# Patient Record
Sex: FEMALE | Race: ASIAN | NOT HISPANIC OR LATINO | URBAN - METROPOLITAN AREA
[De-identification: names, ages, dates, MRNs, and addresses within clinical notes are randomized per-mention and may not be internally consistent; named-entity substitution may affect disease eponyms.]

---

## 2017-01-25 ENCOUNTER — ALLSCRIPTS OFFICE VISIT (OUTPATIENT)
Dept: OTHER | Facility: OTHER | Age: 62
End: 2017-01-25

## 2017-01-25 DIAGNOSIS — Z12.31 ENCOUNTER FOR SCREENING MAMMOGRAM FOR MALIGNANT NEOPLASM OF BREAST: ICD-10-CM

## 2017-02-22 ENCOUNTER — GENERIC CONVERSION - ENCOUNTER (OUTPATIENT)
Dept: OTHER | Facility: OTHER | Age: 62
End: 2017-02-22

## 2017-08-08 ENCOUNTER — ALLSCRIPTS OFFICE VISIT (OUTPATIENT)
Dept: OTHER | Facility: OTHER | Age: 62
End: 2017-08-08

## 2017-10-17 ENCOUNTER — GENERIC CONVERSION - ENCOUNTER (OUTPATIENT)
Dept: OTHER | Facility: OTHER | Age: 62
End: 2017-10-17

## 2017-10-25 ENCOUNTER — ALLSCRIPTS OFFICE VISIT (OUTPATIENT)
Dept: OTHER | Facility: OTHER | Age: 62
End: 2017-10-25

## 2017-10-27 NOTE — PROGRESS NOTES
Assessment  1  Preoperative examination (V72 84) (Z01 818)   2  Cataract of both eyes (366 9) (H26 9)   3  Type II diabetes mellitus (250 00) (E11 9)   4  Combined hyperlipidemia (272 2) (E78 2)    Plan  Encounter for screening mammogram for malignant neoplasm of breast    · * MAMMO SCREENING BILATERAL W CAD; Status:Active; Requested LWO:[id number];     Discussion/Summary  Surgical Clearance: She is at a LOW risk from a cardiovascular standpoint at this time without any additional cardiac testing  Reevaluation needed, if she should present with symptoms prior to surgery/procedure  Comments:  13-year-old female here today for preoperative clearance for cataract procedure, utilizing [employer] interpreting service  She is feeling well and is asymptomatic  Exam unremarkable with exception to elevated BP at 152/82 but has been normal at prior appts  She admits to compliance on her medications, which she had not been a few months prior due to reportedly loosing her insurance, and therefore there are no recent blood panels to confirm control of her diabetes  Based on assessment today and low risk procedure, pt cleared for procedure as scheduled and considered low risk for complication  She will be following up with me in december for her management of chronic conditions and review BW at that time  Surgical clearance faxed to Dr Genie Hamlin   Possible side effects of new medications were reviewed with the patient/guardian today  The treatment plan was reviewed with the patient/guardian  The patient/guardian understands and agrees with the treatment plan      Chief Complaint  pt here for pre-op clearance for cataract surgery on the 2nd of november for her right eye      History of Present Illness  Pre-Op Visit (Brief): The patient is being seen for a preoperative visit-- and-- ****VISIT PERFORMED USING OUR INTERPRETING SERVICE FOR [employer]  The procedure is a(n) right cataract scheduled for 11/2/2017   The indication for surgery is cataracts  Surgical Risk Assessment:   Prior Anesthesia: She had no prior anesthesia  Pertinent Past Medical History: type II diabetes - UNCONTROLLED  Exercise Capacity: able to walk four blocks without symptoms-- and-- able to walk two flights of stairs without symptoms  Lifestyle Factors: denies alcohol use, denies tobacco use and denies illegal drug use  Symptoms: no easy bleeding,-- no easy bruising,-- no frequent nosebleeds,-- no chest pain,-- no cough,-- no dyspnea,-- no edema,-- no palpitations-- and-- no wheezing  Pertinent Family History: mother - diabetes  denies FHx of cardiovascular condition or sudden death  Living Situation: home is secure and supportive and no post-op concerns with her living situation  Review of Systems    Constitutional: No fever, no chills, feels well, no tiredness, no recent weight gain or weight loss  Eyes: cataracts  ENT: no complaints of earache, no loss of hearing, no nose bleeds, no nasal discharge, no sore throat, no hoarseness  Cardiovascular: No complaints of slow heart rate, no fast heart rate, no chest pain, no palpitations, no leg claudication, no lower extremity edema  Respiratory: No complaints of shortness of breath, no wheezing, no cough, no SOB on exertion, no orthopnea, no PND  Gastrointestinal: No complaints of abdominal pain, no constipation, no nausea or vomiting, no diarrhea, no bloody stools  Genitourinary: No complaints of dysuria, no incontinence, no pelvic pain, no dysmenorrhea, no vaginal discharge or bleeding  Musculoskeletal: No complaints of arthralgias, no myalgias, no joint swelling or stiffness, no limb pain or swelling  Integumentary: No complaints of skin rash or lesions, no itching, no skin wounds, no breast pain or lump  Neurological: No complaints of headache, no confusion, no convulsions, no numbness, no dizziness or fainting, no tingling, no limb weakness, no difficulty walking     Psychiatric: Not suicidal, no sleep disturbance, no anxiety or depression, no change in personality, no emotional problems  Endocrine: No complaints of proptosis, no hot flashes, no muscle weakness, no deepening of the voice, no feelings of weakness  Hematologic/Lymphatic: No complaints of swollen glands, no swollen glands in the neck, does not bleed easily, does not bruise easily  Active Problems  1  Combined hyperlipidemia (272 2) (E78 2)   2  Encounter for screening mammogram for malignant neoplasm of breast (V76 12)   (Z12 31)   3  Type II diabetes mellitus (250 00) (E11 9)    Past Medical History   · History of Depression screening (V79 0) (Z13 89)   · History of Encounter for screening colonoscopy (V76 51) (Z12 11)   · History of Encounter to establish care (V65 8) (Z76 89)    The active problems and past medical history were reviewed and updated today  Surgical History   · History of Oral Surgery Tooth Extraction    The surgical history was reviewed and updated today  Family History  Mother    · Family history of diabetes mellitus (V18 0) (Z83 3)    The family history was reviewed and updated today  Social History   · Always uses seat belt   ·    · Never a smoker   · Preferred language is Mandarin [facility]   · Reads [facility]  The social history was reviewed and updated today  Current Meds   1  GlipiZIDE XL 10 MG Oral Tablet Extended Release 24 Hour; take 1 tablet PO in AM and   1 1/2 tablets in the evening; Therapy: 59ENV5068 to (Last Rx:71Xzv6909)  Requested for: 61Mfw5759 Ordered   2  Januvia 50 MG Oral Tablet; TAKE 1 TABLET ONCE DAILY; Therapy: 78GXA4155 to (Evaluate:02Ohs8326)  Requested for: 87Bsb7918; Last   Rx:77Ztf4158 Ordered   3  Lisinopril 5 MG Oral Tablet; Take 1 tablet daily; Therapy: 60DXL1762 to (Last Rx:78Hnd9215)  Requested for: 67Ybj2379 Ordered   4   MetFORMIN HCl - 1000 MG Oral Tablet; take 1 tablet PO in the AM and 1 1/2 tablet in PM;   Therapy: 25Jan2017 to (Last SQ:93CSG7248)  Requested for: 85Lny7631 Ordered   5  Simvastatin 10 MG Oral Tablet; TAKE 1 TABLET DAILY; Therapy: 38VCQ4140 to (Evaluate:30Auf1370)  Requested for: 83Zsf7435; Last   Rx:93Emj7168 Ordered    The medication list was reviewed and updated today  Allergies  1  No Known Drug Allergies    Vitals   Recorded: 99DAD5591 03:03PM Recorded: 62Jio9354 02:49PM   Temperature  97 5 F, Tympanic   Heart Rate  94   Pulse Quality  Normal   Respiration Quality Normal Normal   Respiration 15 5   Systolic  258, RUE, Sitting   Diastolic  82, RUE, Sitting   Height  5 ft 0 2 in   Weight  126 lb 7 oz   BMI Calculated  24 53   BSA Calculated  1 54   O2 Saturation  95   Pain Scale  0     Physical Exam    Constitutional   General appearance: No acute distress, well appearing and well nourished  appears healthy,-- comfortable,-- within normal limits of ideal weight-- and-- appearance reflects stated age  -- vitals reviewed  Head and Face   Head and face: Normal     Eyes   Conjunctiva and lids: No swelling, erythema or discharge  Pupils and irises: Equal, round, reactive to light  -- EOMI, PERRLA  wearing glasses  Ears, Nose, Mouth, and Throat   External inspection of ears and nose: Normal     Otoscopic examination: Tympanic membranes translucent with normal light reflex  Canals patent without erythema  Hearing: Normal  -- grossly normal B/L at 10ft  Nasal mucosa, septum, and turbinates: Normal without edema or erythema  Lips, teeth, and gums: Normal, good dentition  Oropharynx: Normal with no erythema, edema, exudate or lesions  Neck   Thyroid: Normal, no thyromegaly  Pulmonary   Respiratory effort: No increased work of breathing or signs of respiratory distress  Auscultation of lungs: Clear to auscultation  Cardiovascular   Auscultation of heart: Normal rate and rhythm, normal S1 and S2, no murmurs  Carotid pulses: 2+ bilaterally    right 2+,-- no bruit heard over the right carotid,-- left 2+-- and-- no bruit heard over the left carotid  Pedal pulses: 2+ bilaterally  Examination of extremities for edema and/or varicosities: Normal     Abdomen   Abdomen: Non-tender, no masses  The abdomen was flat  Bowel sounds were normal  The abdomen was soft and nontender  Lymphatic   Palpation of lymph nodes in neck: No lymphadenopathy  Musculoskeletal   Gait and station: Normal     Digits and nails: Normal without clubbing or cyanosis  Joints, bones, and muscles: Normal     Psychiatric   Judgment and insight: Normal     Orientation to person, place, and time: Normal     Mood and affect: Normal        Results/Data  PHQ-9 Adult Depression Screening 25Oct2017 03:05PM User, [employer]     Test Name Result Flag Reference   PHQ-9 Adult Depression Score 2     Over the last two weeks, how often have you been bothered by any of the following problems? Little interest or pleasure in doing things: Not at all - 0  Feeling down, depressed, or hopeless: Not at all - 0  Trouble falling or staying asleep, or sleeping too much: Several days - 1  Feeling tired or having little energy: Several days - 1  Poor appetite or over eating: Not at all - 0  Feeling bad about yourself - or that you are a failure or have let yourself or your family down: Not at all - 0  Trouble concentrating on things, such as reading the newspaper or watching television: Not at all - 0  Moving or speaking so slowly that other people could have noticed  Or the opposite -  being so fidgety or restless that you have been moving around a lot more than usual: Not at all - 0  Thoughts that you would be better off dead, or of hurting yourself in some way: Not at all - 0   PHQ-9 Adult Depression Screening Negative     PHQ-9 Difficulty Level Not difficult at all     PHQ-9 Severity Minimal Depression         End of Encounter Meds  1  Simvastatin 10 MG Oral Tablet (Zocor); TAKE 1 TABLET DAILY;    Therapy: 22HFB8597 to (Evaluate:60Amo2402)  Requested for: 07RCT8239; Last   Rx:08Cfv9579 Ordered  2  GlipiZIDE XL 10 MG Oral Tablet Extended Release 24 Hour; take 1 tablet PO in AM and   1 1/2 tablets in the evening; Therapy: 00HWJ7324 to (Last Rx:09Znh0654)  Requested for: 08Aug2017 Ordered   3  Januvia 50 MG Oral Tablet; TAKE 1 TABLET ONCE DAILY; Therapy: 57LPK0940 to (Evaluate:68Lhq4765)  Requested for: 23Fyb6643; Last   Rx:28Fuu9432 Ordered   4  Lisinopril 5 MG Oral Tablet; Take 1 tablet daily; Therapy: 65PFU8747 to (Last Rx:50Ctv1105)  Requested for: 08Aug2017 Ordered   5   MetFORMIN HCl - 1000 MG Oral Tablet; take 1 tablet PO in the AM and 1 1/2 tablet in PM;   Therapy: 73ENX0136 to (Last Rx:44Hap7789)  Requested for: 47Pyf3007 Ordered    Future Appointments    Date/Time Provider Specialty Site   12/12/2017 08:50 AM Jack Muñoz, Nurse Schedule  [address]   12/19/2017 10:30 AM Madhavi Cyr, [address]   Electronically signed by : Doyle Silva, [facility]; Oct 26 2017 10:34AM EST                       (Author)    Electronically signed by : Ammon Reddy DO; Oct 26 2017  4:59PM EST                       (Co-author)

## 2017-12-12 ENCOUNTER — LAB REQUISITION (OUTPATIENT)
Dept: LAB | Facility: HOSPITAL | Age: 62
End: 2017-12-12
Payer: COMMERCIAL

## 2017-12-12 DIAGNOSIS — E78.2 MIXED HYPERLIPIDEMIA: ICD-10-CM

## 2017-12-12 DIAGNOSIS — E11.9 TYPE 2 DIABETES MELLITUS WITHOUT COMPLICATIONS (HCC): ICD-10-CM

## 2017-12-12 LAB
ALBUMIN SERPL BCP-MCNC: 4 G/DL (ref 3.5–5)
ALP SERPL-CCNC: 58 U/L (ref 46–116)
ALT SERPL W P-5'-P-CCNC: 16 U/L (ref 12–78)
ANION GAP SERPL CALCULATED.3IONS-SCNC: 6 MMOL/L (ref 4–13)
AST SERPL W P-5'-P-CCNC: 15 U/L (ref 5–45)
BASOPHILS # BLD AUTO: 0.02 THOUSANDS/ΜL (ref 0–0.1)
BASOPHILS NFR BLD AUTO: 0 % (ref 0–1)
BILIRUB SERPL-MCNC: 0.38 MG/DL (ref 0.2–1)
BUN SERPL-MCNC: 11 MG/DL (ref 5–25)
CALCIUM SERPL-MCNC: 8.9 MG/DL (ref 8.3–10.1)
CHLORIDE SERPL-SCNC: 106 MMOL/L (ref 100–108)
CHOLEST SERPL-MCNC: 125 MG/DL (ref 50–200)
CO2 SERPL-SCNC: 28 MMOL/L (ref 21–32)
CREAT SERPL-MCNC: 0.68 MG/DL (ref 0.6–1.3)
CREAT UR-MCNC: 65.2 MG/DL
EOSINOPHIL # BLD AUTO: 0.1 THOUSAND/ΜL (ref 0–0.61)
EOSINOPHIL NFR BLD AUTO: 2 % (ref 0–6)
ERYTHROCYTE [DISTWIDTH] IN BLOOD BY AUTOMATED COUNT: 12.3 % (ref 11.6–15.1)
EST. AVERAGE GLUCOSE BLD GHB EST-MCNC: 151 MG/DL
GFR SERPL CREATININE-BSD FRML MDRD: 93 ML/MIN/1.73SQ M
GLUCOSE P FAST SERPL-MCNC: 87 MG/DL (ref 65–99)
HBA1C MFR BLD: 6.9 % (ref 4.2–6.3)
HCT VFR BLD AUTO: 35.9 % (ref 34.8–46.1)
HDLC SERPL-MCNC: 57 MG/DL (ref 40–60)
HGB BLD-MCNC: 11.6 G/DL (ref 11.5–15.4)
LDLC SERPL CALC-MCNC: 40 MG/DL (ref 0–100)
LYMPHOCYTES # BLD AUTO: 1.78 THOUSANDS/ΜL (ref 0.6–4.47)
LYMPHOCYTES NFR BLD AUTO: 36 % (ref 14–44)
MCH RBC QN AUTO: 32.5 PG (ref 26.8–34.3)
MCHC RBC AUTO-ENTMCNC: 32.3 G/DL (ref 31.4–37.4)
MCV RBC AUTO: 101 FL (ref 82–98)
MICROALBUMIN UR-MCNC: 6.6 MG/L (ref 0–20)
MICROALBUMIN/CREAT 24H UR: 10 MG/G CREATININE (ref 0–30)
MONOCYTES # BLD AUTO: 0.43 THOUSAND/ΜL (ref 0.17–1.22)
MONOCYTES NFR BLD AUTO: 9 % (ref 4–12)
NEUTROPHILS # BLD AUTO: 2.59 THOUSANDS/ΜL (ref 1.85–7.62)
NEUTS SEG NFR BLD AUTO: 53 % (ref 43–75)
NRBC BLD AUTO-RTO: 0 /100 WBCS
PLATELET # BLD AUTO: 259 THOUSANDS/UL (ref 149–390)
PMV BLD AUTO: 10.3 FL (ref 8.9–12.7)
POTASSIUM SERPL-SCNC: 5 MMOL/L (ref 3.5–5.3)
PROT SERPL-MCNC: 7.4 G/DL (ref 6.4–8.2)
RBC # BLD AUTO: 3.57 MILLION/UL (ref 3.81–5.12)
SODIUM SERPL-SCNC: 140 MMOL/L (ref 136–145)
TRIGL SERPL-MCNC: 142 MG/DL
WBC # BLD AUTO: 4.94 THOUSAND/UL (ref 4.31–10.16)

## 2017-12-12 PROCEDURE — 80053 COMPREHEN METABOLIC PANEL: CPT | Performed by: PHYSICIAN ASSISTANT

## 2017-12-12 PROCEDURE — 82043 UR ALBUMIN QUANTITATIVE: CPT | Performed by: PHYSICIAN ASSISTANT

## 2017-12-12 PROCEDURE — 85025 COMPLETE CBC W/AUTO DIFF WBC: CPT | Performed by: PHYSICIAN ASSISTANT

## 2017-12-12 PROCEDURE — 82570 ASSAY OF URINE CREATININE: CPT | Performed by: PHYSICIAN ASSISTANT

## 2017-12-12 PROCEDURE — 80061 LIPID PANEL: CPT | Performed by: PHYSICIAN ASSISTANT

## 2017-12-12 PROCEDURE — 83036 HEMOGLOBIN GLYCOSYLATED A1C: CPT | Performed by: PHYSICIAN ASSISTANT

## 2017-12-13 ENCOUNTER — GENERIC CONVERSION - ENCOUNTER (OUTPATIENT)
Dept: OTHER | Facility: OTHER | Age: 62
End: 2017-12-13

## 2017-12-19 ENCOUNTER — ALLSCRIPTS OFFICE VISIT (OUTPATIENT)
Dept: OTHER | Facility: OTHER | Age: 62
End: 2017-12-19

## 2017-12-19 DIAGNOSIS — Z12.31 ENCOUNTER FOR SCREENING MAMMOGRAM FOR MALIGNANT NEOPLASM OF BREAST: ICD-10-CM

## 2017-12-29 NOTE — PROGRESS NOTES
Assessment   1  Type II diabetes mellitus (250 00) (E11 9)   2  Combined hyperlipidemia (272 2) (E78 2)   3  Influenza vaccine administered (V04 81) (Z23)    Plan   Combined hyperlipidemia, Type II diabetes mellitus    · Continue with our present treatment plan ; Status:Complete;   Done: 76CMM0578  Influenza vaccine administered    · Fluzone Quadrivalent Intramuscular Suspension    Discussion/Summary      77-year-old female here today for routine follow-up as follows:      Type 2 diabetes: Blood work reviewed with most recent hemoglobin A1c at 6 9%  She is compliant on her diabetic medications, tolerating them well  She is on an ACE-inhibitor with normal microalbumin  Kidney function also within normal limits on CMP  Her eye exam is up-to-date October 2017, foot exam up-to-date August 2017  Continue current treatment plan and we will recheck labs in 4-6 months  Total cholesterol 125, LDL 40, triglycerides 142, HDL 57  She is compliant on her atorvastatin  Continue current treatment plan and we will continue to monitor  prescription given  Colonoscopy up-to-date  Fluzone vaccine administered today  Patient requesting a handicap placard due to arthritis in knees and pain and stiffness with long walking  Placard form provided today  Follow-up in 4 months, sooner if any problems  Possible side effects of new medications were reviewed with the patient/guardian today  The treatment plan was reviewed with the patient/guardian  The patient/guardian understands and agrees with the treatment plan      Chief Complaint   pt here for 4 month f/u and to review blood work  pt also states that she would like a application for a placard, pt states that she is not able to walk far because of her pain in both knee  Patient is here today for follow up of chronic conditions described in HPI  History of Present Illness   knees are bothering her, knees feel stiff at times, cannot walk to far without knees hurting   would like handcap sticker  cold causes worse pain  The patient is here today for a follow-up visit  Her diabetes mellitus type 2 is stable  the patient is adherent with her medication regimen  -- She denies medication side effects  Her hyperlipidemia has been stable  Her LDL goal is 70 mg/dL-- and-- last LDL was 40 mg/dL  the patient is adherent with her medication regimen  -- She denies medication side effects  Symptoms: denies chest pain,-- denies intermittent leg claudication,-- denies dyspnea,-- denies lower extremity edema,-- denies exercise intolerance,-- denies fatigue,-- denies numbness of the feet,-- denies foot pain,-- denies a foot ulcer,-- denies visual impairment,-- denies muscle pain-- and-- denies muscle weakness  Lifestyle and Disease Management: Diet: She consumes a diverse and healthy diet  Weight Issues: She does not have any weight concerns  Exercise: She does not exercise regularly  Smoking: She does not use tobacco  Alcohol: She denies alcohol use  Drug Use: She denies drug use  Goals: the patient is doing well with her goals  Review of Systems        Constitutional: No fever, no chills, feels well, no tiredness, no recent weight gain or weight loss  Cardiovascular: No complaints of slow heart rate, no fast heart rate, no chest pain, no palpitations, no leg claudication, no lower extremity edema  Respiratory: No complaints of shortness of breath, no wheezing, no cough, no SOB on exertion, no orthopnea, no PND  Gastrointestinal: No complaints of abdominal pain, no constipation, no nausea or vomiting, no diarrhea, no bloody stools  Genitourinary: No complaints of dysuria, no incontinence, no pelvic pain, no dysmenorrhea, no vaginal discharge or bleeding  Musculoskeletal: as noted in HPI  Neurological: No complaints of headache, no confusion, no convulsions, no numbness, no dizziness or fainting, no tingling, no limb weakness, no difficulty walking  Psychiatric: Not suicidal, no sleep disturbance, no anxiety or depression, no change in personality, no emotional problems  Active Problems   1  Cataract of both eyes (366 9) (H26 9)   2  Combined hyperlipidemia (272 2) (E78 2)   3  Encounter for screening mammogram for malignant neoplasm of breast (V76 12)     (Z12 31)   4  Preoperative examination (V72 84) (Z01 818)   5  Type II diabetes mellitus (250 00) (E11 9)    Past Medical History   1  History of Depression screening (V79 0) (Z13 89)   2  History of Encounter for screening colonoscopy (V76 51) (Z12 11)   3  History of Encounter to establish care (V65 8) (Z76 89)    Surgical History   1  History of Oral Surgery Tooth Extraction     The surgical history was reviewed and updated today  Family History   Mother    1  Family history of diabetes mellitus (V18 0) (Z83 3)     The family history was reviewed and updated today  Social History    · Always uses seat belt   ·    · Never a smoker   · Preferred language is Mandarin [facility]   · Reads [facility]  The social history was reviewed and updated today  Current Meds    1  GlipiZIDE XL 10 MG Oral Tablet Extended Release 24 Hour; take 1 tablet PO in AM and 1     1/2 tablets in the evening; Therapy: 60SSO2492 to (Last Rx:75Scw9082)  Requested for: 23Pec0965 Ordered   2  Januvia 50 MG Oral Tablet; TAKE 1 TABLET ONCE DAILY; Therapy: 31GCY2534 to ((36) 075-621)  Requested for: 27Oct2017; Last     IV:89PBQ4919 Ordered   3  Lisinopril 5 MG Oral Tablet; Take 1 tablet daily; Therapy: 83NDA6651 to (Last Rx:04Cfc1746)  Requested for: 70Cax4411 Ordered   4  MetFORMIN HCl - 1000 MG Oral Tablet; take 1 tablet PO in the AM and 1 1/2 tablet in PM;     Therapy: 06FQR3360 to (Last Rx:06Nov2017)  Requested for: 56OWO5957 Ordered   5  Simvastatin 10 MG Oral Tablet; TAKE 1 TABLET DAILY;      Therapy: 26TME1770 to (Evaluate:40Fzd4117)  Requested for: 06Mfa4406; Last     Rx:85Bhp6849 Ordered The medication list was reviewed and updated today  Allergies   1  No Known Drug Allergies    Vitals   Vital Signs    Recorded: 06IPQ6927 10:36AM   Temperature 98 4 F, Tympanic   Heart Rate 94   Pulse Quality Normal   Respiration Quality Normal   Respiration 16   Systolic 098, LLE, Sitting   Diastolic 90, LLE, Sitting   Height 5 ft    Weight 132 lb 2 oz   BMI Calculated 25 8   BSA Calculated 1 57   O2 Saturation 96   Pain Scale 0     Physical Exam        Constitutional      General appearance: No acute distress, well appearing and well nourished  appears healthy,-- within normal limits of ideal weight-- and-- appearance reflects stated age  Pulmonary      Respiratory effort: No increased work of breathing or signs of respiratory distress  Auscultation of lungs: Clear to auscultation  Cardiovascular      Auscultation of heart: Normal rate and rhythm, normal S1 and S2, without murmurs  Examination of extremities for edema and/or varicosities: Normal  -- pedal pulses intact  Carotid pulses: Normal   right 2+,-- no bruit heard over the right carotid,-- left 2+-- and-- no bruit heard over the left carotid  Lymphatic      Palpation of lymph nodes in neck: No lymphadenopathy  Musculoskeletal      Gait and station: Normal        Psychiatric      Orientation to person, place, and time: Normal        Mood and affect: Normal        Additional Exam:  vitals reviewed - BP stable  diastoic mildly elevated at 90           Results/Data   [facility] - Eye Exam 29PGI5062 12:00AM       Test Name Result Flag Reference   Retinopathy Screening      Retinopathy Screening not performed      Summary / No summary entered :        No summary entered  Documents attached :        [facility]; Enc: [id number] - Image Encounter - [facility]) (Additional Information Document)  Summary / No summary entered :        No summary entered  Documents attached : sOpthalmology - Alyssa Collins; Enc: [id number] - Image Encounter - Ghulam Para - [facility]) (Additional Information Document)  (1) CBC/PLT/DIFF 37EQE6560 09:14AM Jerlean Dandy Order Number: [id number]      Test Name Result Flag Reference   WBC COUNT 4 94 Thousand/uL  4 31-10 16   RBC COUNT 3 57 Million/uL L 3 81-5 12   HEMOGLOBIN 11 6 g/dL  11 5-15 4   HEMATOCRIT 35 9 %  34 8-46  1    fL H 82-98   MCH 32 5 pg  26 8-34 3   MCHC 32 3 g/dL  31 4-37 4   RDW 12 3 %  11 6-15 1   MPV 10 3 fL  8 9-12 7   PLATELET COUNT 517 Thousands/uL  149-390   nRBC AUTOMATED 0 /100 WBCs     NEUTROPHILS RELATIVE PERCENT 53 %  43-75   LYMPHOCYTES RELATIVE PERCENT 36 %  14-44   MONOCYTES RELATIVE PERCENT 9 %  4-12   EOSINOPHILS RELATIVE PERCENT 2 %  0-6   BASOPHILS RELATIVE PERCENT 0 %  0-1   NEUTROPHILS ABSOLUTE COUNT 2 59 Thousands/? ??L  1 85-7 62   LYMPHOCYTES ABSOLUTE COUNT 1 78 Thousands/? ??L  0 60-4 47   MONOCYTES ABSOLUTE COUNT 0 43 Thousand/? ??L  0 17-1 22   EOSINOPHILS ABSOLUTE COUNT 0 10 Thousand/? ??L  0 00-0 61   BASOPHILS ABSOLUTE COUNT 0 02 Thousands/? ??L  0 00-0 10      (1) COMPREHENSIVE METABOLIC PANEL 09TCQ0723 00:48GT Roe Arriaga    Order Number: [id number]      Test Name Result Flag Reference   SODIUM 140 mmol/L  136-145   POTASSIUM 5 0 mmol/L  3 5-5 3   CHLORIDE 106 mmol/L  100-108   CARBON DIOXIDE 28 mmol/L  21-32   ANION GAP (CALC) 6 mmol/L  4-13   BLOOD UREA NITROGEN 11 mg/dL  5-25   CREATININE 0 68 mg/dL  0 60-1 30   Standardized to IDMS reference method   CALCIUM 8 9 mg/dL  8 3-10 1   BILI, TOTAL 0 38 mg/dL  0 20-1 00   ALK PHOSPHATAS 58 U/L     ALT (SGPT) 16 U/L  12-78   Specimen collection should occur prior to Sulfasalazine and/or Sulfapyridine administration due to the potential for falsely depressed results     AST(SGOT) 15 U/L  5-45   Specimen collection should occur prior to Sulfasalazine administration due to the potential for falsely depressed results  ALBUMIN 4 0 g/dL  3 5-5 0   TOTAL PROTEIN 7 4 g/dL  6 4-8 2   eGFR 93 ml/min/1 73sq m     National Kidney Disease Education Program recommendations are as follows:     GFR calculation is accurate only with a steady state creatinine     Chronic Kidney disease less than 60 ml/min/1 73 sq  meters     Kidney failure less than 15 ml/min/1 73 sq  meters  GLUCOSE FASTING 87 mg/dL  65-99   Specimen collection should occur prior to Sulfasalazine administration due to the potential for falsely depressed results  Specimen collection should occur prior to Sulfapyridine administration due to the potential for falsely elevated results  (1) LIPID PANEL FASTING W DIRECT LDL REFLEX 67Qnj0834 09:14AM John Apple Order Number: [id number]      Test Name Result Flag Reference   CHOLESTEROL 125 mg/dL     LDL CHOLESTEROL CALCULATED 40 mg/dL  0-100   Triglyceride:           Normal <150 mg/dl      Borderline High 150-199 mg/dl      High 200-499 mg/dl      Very High >499 mg/dl               Cholesterol:          Desirable <200 mg/dl       Borderline High 200-239 mg/dl       High >239 mg/dl               HDL Cholesterol:          High>59 mg/dL       Low <41 mg/dL               HDL Cholesterol:          High>59 mg/dL       Low <41 mg/dL               This screening LDL is a calculated result  It does not have the accuracy of the Direct Measured LDL in the monitoring of patients with hyperlipidemia and/or statin therapy  Direct Measure LDL (MXJ194) must be ordered separately in these patients  TRIGLYCERIDES 142 mg/dL  <=150   Specimen collection should occur prior to N-Acetylcysteine or Metamizole administration due to the potential for falsely depressed results  HDL,DIRECT 57 mg/dL  40-60   Specimen collection should occur prior to Metamizole administration due to the potential for falsley depressed results        (1) HEMOGLOBIN A1C 86Mnz5565 09:14AM Adriano Bourne    Order Number: [id number]      Test Name Result Flag Reference   HEMOGLOBIN A1C 6 9 % H 4 2-6 3   EST  AVG  GLUCOSE 151 mg/dl        (1) MICROALBUMIN CREATININE RATIO, RANDOM URINE 91Auw4856 09:14AM Qasim Servin   TW Order Number: [id number]      Test Name Result Flag Reference   MICROALBUMIN/ CREAT R 10 mg/g creatinine  0-30   MICROALBUMIN,URINE 6 6 mg/L  0 0-20 0   CREATININE URINE 65 2 mg/dL        Health Management   History of Encounter for screening colonoscopy   COLONOSCOPY; every 7 years; Last 02GPD3487; Next Due: 36WUM7857;  Active    Future Appointments      Date/Time Provider Specialty Site   04/20/2018 09:00 AM Qasim Servin, [address]    Electronically signed by : Devika Rice, [facility]; Dec 19 2017 11:17AM EST                       (Author)     Electronically signed by : Janine Hart DO; Dec 29 2017  4:15AM EST                       (Co-author)

## 2018-01-10 NOTE — CONSULTS
Chief Complaint  pt here for pre-op clearance for cataract surgery on the 2nd of november for her right eye      History of Present Illness  Pre-Op Visit (Brief): The patient is being seen for a preoperative visit and ****VISIT PERFORMED USING OUR INTERPRETING SERVICE FOR [place]  The procedure is a(n) right cataract scheduled for 11/2/2017  The indication for surgery is cataracts  Surgical Risk Assessment:   Prior Anesthesia: She had no prior anesthesia  Pertinent Past Medical History: type II diabetes - UNCONTROLLED  Exercise Capacity: able to walk four blocks without symptoms and able to walk two flights of stairs without symptoms  Lifestyle Factors: denies alcohol use, denies tobacco use and denies illegal drug use  Symptoms: no easy bleeding, no easy bruising, no frequent nosebleeds, no chest pain, no cough, no dyspnea, no edema, no palpitations and no wheezing  Pertinent Family History: mother - diabetes  denies FHx of cardiovascular condition or sudden death  Living Situation: home is secure and supportive and no post-op concerns with her living situation  Review of Systems    Constitutional: No fever, no chills, feels well, no tiredness, no recent weight gain or weight loss  Eyes: cataracts  ENT: no complaints of earache, no loss of hearing, no nose bleeds, no nasal discharge, no sore throat, no hoarseness  Cardiovascular: No complaints of slow heart rate, no fast heart rate, no chest pain, no palpitations, no leg claudication, no lower extremity edema  Respiratory: No complaints of shortness of breath, no wheezing, no cough, no SOB on exertion, no orthopnea, no PND  Gastrointestinal: No complaints of abdominal pain, no constipation, no nausea or vomiting, no diarrhea, no bloody stools  Genitourinary: No complaints of dysuria, no incontinence, no pelvic pain, no dysmenorrhea, no vaginal discharge or bleeding     Musculoskeletal: No complaints of arthralgias, no myalgias, no joint swelling or stiffness, no limb pain or swelling  Integumentary: No complaints of skin rash or lesions, no itching, no skin wounds, no breast pain or lump  Neurological: No complaints of headache, no confusion, no convulsions, no numbness, no dizziness or fainting, no tingling, no limb weakness, no difficulty walking  Psychiatric: Not suicidal, no sleep disturbance, no anxiety or depression, no change in personality, no emotional problems  Endocrine: No complaints of proptosis, no hot flashes, no muscle weakness, no deepening of the voice, no feelings of weakness  Hematologic/Lymphatic: No complaints of swollen glands, no swollen glands in the neck, does not bleed easily, does not bruise easily  Active Problems    1  Combined hyperlipidemia (272 2) (E78 2)   2  Encounter for screening mammogram for malignant neoplasm of breast (V76 12)   (Z12 31)   3  Type II diabetes mellitus (250 00) (E11 9)    Past Medical History    · History of Depression screening (V79 0) (Z13 89)   · History of Encounter for screening colonoscopy (V76 51) (Z12 11)   · History of Encounter to establish care (V65 8) (Z76 89)    The active problems and past medical history were reviewed and updated today  Surgical History    · History of Oral Surgery Tooth Extraction    The surgical history was reviewed and updated today  Family History    · Family history of diabetes mellitus (V18 0) (Z83 3)    The family history was reviewed and updated today  Social History    · Always uses seat belt   ·    · Never a smoker   · Preferred language is Mandarin [facility]   · Reads [facility]  The social history was reviewed and updated today  Current Meds   1  GlipiZIDE XL 10 MG Oral Tablet Extended Release 24 Hour; take 1 tablet PO in AM and 1   1/2 tablets in the evening; Therapy: 82EYQ2233 to (Last Rx:67Zlw8057)  Requested for: 26Kwb0358 Ordered   2  Januvia 50 MG Oral Tablet; TAKE 1 TABLET ONCE DAILY;    Therapy: 02GCY6102 to (Evaluate:67Ras9445)  Requested for: 14Akw1360; Last   Rx:95Jva5800 Ordered   3  Lisinopril 5 MG Oral Tablet; Take 1 tablet daily; Therapy: 00DIH0278 to (Last Rx:38Jli2162)  Requested for: 08Aug2017 Ordered   4  MetFORMIN HCl - 1000 MG Oral Tablet; take 1 tablet PO in the AM and 1 1/2 tablet in PM;   Therapy: 33RPD0338 to (Last Rx:08Aug2017)  Requested for: 08Aug2017 Ordered   5  Simvastatin 10 MG Oral Tablet; TAKE 1 TABLET DAILY; Therapy: 60NXV0525 to (Evaluate:04Feb2018)  Requested for: 08Aug2017; Last   Rx:55Oax5194 Ordered    The medication list was reviewed and updated today  Allergies    1  No Known Drug Allergies    Vitals  Signs    Respiration Quality: Normal  Respiration: 15   Temperature: 97 5 F, Tympanic  Heart Rate: 94  Pulse Quality: Normal  Respiration Quality: Normal  Respiration: 5  Systolic: 419, RUE, Sitting  Diastolic: 82, RUE, Sitting  Height: 5 ft 0 2 in  Weight: 126 lb 7 oz  BMI Calculated: 24 53  BSA Calculated: 1 54  O2 Saturation: 95  Pain Scale: 0    Physical Exam    Constitutional   General appearance: No acute distress, well appearing and well nourished  appears healthy, comfortable, within normal limits of ideal weight and appearance reflects stated age  vitals reviewed  Head and Face   Head and face: Normal     Eyes   Conjunctiva and lids: No swelling, erythema or discharge  Pupils and irises: Equal, round, reactive to light  EOMI, PERRLA  wearing glasses  Ears, Nose, Mouth, and Throat   External inspection of ears and nose: Normal     Otoscopic examination: Tympanic membranes translucent with normal light reflex  Canals patent without erythema  Hearing: Normal   grossly normal B/L at 10ft  Nasal mucosa, septum, and turbinates: Normal without edema or erythema  Lips, teeth, and gums: Normal, good dentition  Oropharynx: Normal with no erythema, edema, exudate or lesions  Neck   Thyroid: Normal, no thyromegaly      Pulmonary   Respiratory effort: No increased work of breathing or signs of respiratory distress  Auscultation of lungs: Clear to auscultation  Cardiovascular   Auscultation of heart: Normal rate and rhythm, normal S1 and S2, no murmurs  Carotid pulses: 2+ bilaterally  right 2+, no bruit heard over the right carotid, left 2+ and no bruit heard over the left carotid  Pedal pulses: 2+ bilaterally  Examination of extremities for edema and/or varicosities: Normal     Abdomen   Abdomen: Non-tender, no masses  The abdomen was flat  Bowel sounds were normal  The abdomen was soft and nontender  Lymphatic   Palpation of lymph nodes in neck: No lymphadenopathy  Musculoskeletal   Gait and station: Normal     Digits and nails: Normal without clubbing or cyanosis  Joints, bones, and muscles: Normal     Psychiatric   Judgment and insight: Normal     Orientation to person, place, and time: Normal     Mood and affect: Normal        Results/Data  PHQ-9 Adult Depression Screening 25Oct2017 03:05PM UserElsi     Test Name Result Flag Reference   PHQ-9 Adult Depression Score 2     Over the last two weeks, how often have you been bothered by any of the following problems? Little interest or pleasure in doing things: Not at all - 0  Feeling down, depressed, or hopeless: Not at all - 0  Trouble falling or staying asleep, or sleeping too much: Several days - 1  Feeling tired or having little energy: Several days - 1  Poor appetite or over eating: Not at all - 0  Feeling bad about yourself - or that you are a failure or have let yourself or your family down: Not at all - 0  Trouble concentrating on things, such as reading the newspaper or watching television: Not at all - 0  Moving or speaking so slowly that other people could have noticed   Or the opposite -  being so fidgety or restless that you have been moving around a lot more than usual: Not at all - 0  Thoughts that you would be better off dead, or of hurting yourself in some way: Not at all - 0   PHQ-9 Adult Depression Screening Negative     PHQ-9 Difficulty Level Not difficult at all     PHQ-9 Severity Minimal Depression         Assessment    1  Preoperative examination (V72 84) (Z01 818)   2  Cataract of both eyes (366 9) (H26 9)   3  Type II diabetes mellitus (250 00) (E11 9)   4  Combined hyperlipidemia (272 2) (E78 2)    Plan  Encounter for screening mammogram for malignant neoplasm of breast    · * MAMMO SCREENING BILATERAL W CAD; Status:Active; Requested WSS:25MDV7482;     Discussion/Summary  Surgical Clearance: She is at a LOW risk from a cardiovascular standpoint at this time without any additional cardiac testing  Reevaluation needed, if she should present with symptoms prior to surgery/procedure  Comments:  57-year-old female here today for preoperative clearance for cataract procedure, [facility]  She is feeling well and is asymptomatic  Exam unremarkable with exception to elevated BP at 152/82 but has been normal at prior appts  She admits to compliance on her medications, which she had not been a few months prior due to reportedly loosing her insurance, and therefore there are no recent blood panels to confirm control of her diabetes  Based on assessment today and low risk procedure, pt cleared for procedure as scheduled and considered low risk for complication  She will be following up with me in december for her management of chronic conditions and review BW at that time  Surgical clearance faxed to Dr Shanna Porter   Possible side effects of new medications were reviewed with the patient/guardian today  The treatment plan was reviewed with the patient/guardian  The patient/guardian understands and agrees with the treatment plan      End of Encounter Meds    1  Simvastatin 10 MG Oral Tablet (Zocor); TAKE 1 TABLET DAILY; Therapy: 89BHH5525 to (Evaluate:06Bua4957)  Requested for: 17Zwh0233; Last   Rx:77Xov1288 Ordered    2   GlipiZIDE XL 10 MG Oral Tablet Extended Release 24 Hour; take 1 tablet PO in AM and 1   1/2 tablets in the evening; Therapy: 81MMZ9485 to (Last Rx:01Wti8978)  Requested for: 55Foo3343 Ordered   3  Januvia 50 MG Oral Tablet; TAKE 1 TABLET ONCE DAILY; Therapy: 40XMP2394 to (Evaluate:73Ttd3926)  Requested for: 44Ikq8515; Last   Rx:66Kzp7612 Ordered   4  Lisinopril 5 MG Oral Tablet; Take 1 tablet daily; Therapy: 17WPR0272 to (Last Rx:03Dzm4796)  Requested for: 08Aug2017 Ordered   5   MetFORMIN HCl - 1000 MG Oral Tablet; take 1 tablet PO in the AM and 1 1/2 tablet in PM;   Therapy: 41SVU8064 to (Last Rx:28Tsv7646)  Requested for: 45Drp7605 Ordered    Signatures   Electronically signed by : Alysia Suero [facility]; Oct 26 2017 10:34AM EST                       (Author)    Electronically signed by : Miguel Mace DO; Oct 26 2017  4:59PM EST                       (Co-author)

## 2018-01-13 VITALS
HEIGHT: 57 IN | RESPIRATION RATE: 16 BRPM | OXYGEN SATURATION: 97 % | TEMPERATURE: 98.7 F | SYSTOLIC BLOOD PRESSURE: 124 MMHG | HEART RATE: 97 BPM | BODY MASS INDEX: 27.71 KG/M2 | WEIGHT: 128.44 LBS | DIASTOLIC BLOOD PRESSURE: 76 MMHG

## 2018-01-14 VITALS
BODY MASS INDEX: 24.82 KG/M2 | SYSTOLIC BLOOD PRESSURE: 152 MMHG | WEIGHT: 126.44 LBS | DIASTOLIC BLOOD PRESSURE: 82 MMHG | OXYGEN SATURATION: 95 % | TEMPERATURE: 97.5 F | RESPIRATION RATE: 15 BRPM | HEART RATE: 94 BPM | HEIGHT: 60 IN

## 2018-01-14 VITALS
SYSTOLIC BLOOD PRESSURE: 118 MMHG | HEIGHT: 60 IN | HEART RATE: 97 BPM | WEIGHT: 118.19 LBS | RESPIRATION RATE: 16 BRPM | TEMPERATURE: 96.4 F | OXYGEN SATURATION: 97 % | DIASTOLIC BLOOD PRESSURE: 64 MMHG | BODY MASS INDEX: 23.2 KG/M2

## 2018-01-23 VITALS
SYSTOLIC BLOOD PRESSURE: 130 MMHG | DIASTOLIC BLOOD PRESSURE: 90 MMHG | RESPIRATION RATE: 16 BRPM | HEIGHT: 60 IN | HEART RATE: 94 BPM | BODY MASS INDEX: 25.94 KG/M2 | OXYGEN SATURATION: 96 % | TEMPERATURE: 98.4 F | WEIGHT: 132.13 LBS

## 2018-01-23 NOTE — RESULT NOTES
Verified Results  (1) CBC/PLT/DIFF 34Hcs7968 09:14AM Alyssa Carlson    Order Number: [id number]     Test Name Result Flag Reference   WBC COUNT 4 94 Thousand/uL  4 31-10 16   RBC COUNT 3 57 Million/uL L 3 81-5 12   HEMOGLOBIN 11 6 g/dL  11 5-15 4   HEMATOCRIT 35 9 %  34 8-46  1    fL H 82-98   MCH 32 5 pg  26 8-34 3   MCHC 32 3 g/dL  31 4-37 4   RDW 12 3 %  11 6-15 1   MPV 10 3 fL  8 9-12 7   PLATELET COUNT 012 Thousands/uL  149-390   nRBC AUTOMATED 0 /100 WBCs     NEUTROPHILS RELATIVE PERCENT 53 %  43-75   LYMPHOCYTES RELATIVE PERCENT 36 %  14-44   MONOCYTES RELATIVE PERCENT 9 %  4-12   EOSINOPHILS RELATIVE PERCENT 2 %  0-6   BASOPHILS RELATIVE PERCENT 0 %  0-1   NEUTROPHILS ABSOLUTE COUNT 2 59 Thousands/? ??L  1 85-7 62   LYMPHOCYTES ABSOLUTE COUNT 1 78 Thousands/? ??L  0 60-4 47   MONOCYTES ABSOLUTE COUNT 0 43 Thousand/? ??L  0 17-1 22   EOSINOPHILS ABSOLUTE COUNT 0 10 Thousand/? ??L  0 00-0 61   BASOPHILS ABSOLUTE COUNT 0 02 Thousands/? ??L  0 00-0 10     (1) COMPREHENSIVE METABOLIC PANEL 13TCZ4519 88:06BP Alyssa Carlson    Order Number: [id number]     Test Name Result Flag Reference   SODIUM 140 mmol/L  136-145   POTASSIUM 5 0 mmol/L  3 5-5 3   CHLORIDE 106 mmol/L  100-108   CARBON DIOXIDE 28 mmol/L  21-32   ANION GAP (CALC) 6 mmol/L  4-13   BLOOD UREA NITROGEN 11 mg/dL  5-25   CREATININE 0 68 mg/dL  0 60-1 30   Standardized to IDMS reference method   CALCIUM 8 9 mg/dL  8 3-10 1   BILI, TOTAL 0 38 mg/dL  0 20-1 00   ALK PHOSPHATAS 58 U/L     ALT (SGPT) 16 U/L  12-78   Specimen collection should occur prior to Sulfasalazine and/or Sulfapyridine administration due to the potential for falsely depressed results  AST(SGOT) 15 U/L  5-45   Specimen collection should occur prior to Sulfasalazine administration due to the potential for falsely depressed results     ALBUMIN 4 0 g/dL  3 5-5 0   TOTAL PROTEIN 7 4 g/dL  6 4-8 2   eGFR 93 ml/min/1 73sq m     National Kidney Disease Education Program recommendations are as follows:  GFR calculation is accurate only with a steady state creatinine  Chronic Kidney disease less than 60 ml/min/1 73 sq  meters  Kidney failure less than 15 ml/min/1 73 sq  meters  GLUCOSE FASTING 87 mg/dL  65-99   Specimen collection should occur prior to Sulfasalazine administration due to the potential for falsely depressed results  Specimen collection should occur prior to Sulfapyridine administration due to the potential for falsely elevated results  (1) LIPID PANEL FASTING W DIRECT LDL REFLEX 12Dec2017 09:14AM Laverne Boeck Order Number: [id number]     Test Name Result Flag Reference   CHOLESTEROL 125 mg/dL     LDL CHOLESTEROL CALCULATED 40 mg/dL  0-100   Triglyceride:        Normal <150 mg/dl   Borderline High 150-199 mg/dl   High 200-499 mg/dl   Very High >499 mg/dl      Cholesterol:       Desirable <200 mg/dl    Borderline High 200-239 mg/dl    High >239 mg/dl      HDL Cholesterol:       High>59 mg/dL    Low <41 mg/dL      HDL Cholesterol:       High>59 mg/dL    Low <41 mg/dL      This screening LDL is a calculated result  It does not have the accuracy of the Direct Measured LDL in the monitoring of patients with hyperlipidemia and/or statin therapy  Direct Measure LDL (ICI853) must be ordered separately in these patients  TRIGLYCERIDES 142 mg/dL  <=150   Specimen collection should occur prior to N-Acetylcysteine or Metamizole administration due to the potential for falsely depressed results  HDL,DIRECT 57 mg/dL  40-60   Specimen collection should occur prior to Metamizole administration due to the potential for falsley depressed results  (1) HEMOGLOBIN A1C 12Dec2017 09:14AM Laverne Boeck Order Number: [id number]     Test Name Result Flag Reference   HEMOGLOBIN A1C 6 9 % H 4 2-6 3   EST  AVG   GLUCOSE 151 mg/dl       (1) MICROALBUMIN CREATININE RATIO, RANDOM URINE 12Dec2017 09:14AM Laverne Boeck Order Number: [id number]     Test Name Result Flag Reference   MICROALBUMIN/ CREAT R 10 mg/g creatinine  0-30   MICROALBUMIN,URINE 6 6 mg/L  0 0-20 0   CREATININE URINE 65 2 mg/dL

## 2018-02-12 ENCOUNTER — TELEPHONE (OUTPATIENT)
Dept: FAMILY MEDICINE CLINIC | Facility: CLINIC | Age: 63
End: 2018-02-12

## 2018-02-12 ENCOUNTER — DOCUMENTATION (OUTPATIENT)
Dept: FAMILY MEDICINE CLINIC | Facility: CLINIC | Age: 63
End: 2018-02-12

## 2018-02-13 DIAGNOSIS — E11.9 TYPE 2 DIABETES MELLITUS WITHOUT COMPLICATION, WITHOUT LONG-TERM CURRENT USE OF INSULIN (HCC): Primary | ICD-10-CM

## 2018-02-13 PROBLEM — E78.2 COMBINED HYPERLIPIDEMIA: Status: ACTIVE | Noted: 2017-01-25

## 2018-02-13 PROBLEM — H26.9 CATARACT OF BOTH EYES: Status: ACTIVE | Noted: 2017-10-26

## 2018-02-13 RX ORDER — SIMVASTATIN 10 MG
1 TABLET ORAL DAILY
COMMUNITY
Start: 2017-01-25 | End: 2018-04-19 | Stop reason: SDUPTHER

## 2018-02-13 RX ORDER — LINAGLIPTIN 5 MG/1
5 TABLET, FILM COATED ORAL DAILY
Qty: 90 TABLET | Refills: 1 | Status: SHIPPED | OUTPATIENT
Start: 2018-02-13 | End: 2018-04-19 | Stop reason: SDUPTHER

## 2018-02-13 RX ORDER — LISINOPRIL 5 MG/1
1 TABLET ORAL DAILY
COMMUNITY
Start: 2017-01-25 | End: 2018-04-19 | Stop reason: SDUPTHER

## 2018-02-13 RX ORDER — GLIPIZIDE 10 MG/1
TABLET, FILM COATED, EXTENDED RELEASE ORAL
COMMUNITY
Start: 2017-01-25 | End: 2018-04-19 | Stop reason: SDUPTHER

## 2018-02-13 NOTE — TELEPHONE ENCOUNTER
Noted  tradjenta 5mg tab once daily sent for 90-day supply to [facility] for her  Please notify pt once you receive approval to notify her of the switch  All meds stay same except switch from [place] [facility] [place] to tradjenta, still 1 tablet by mouth daily   thanks

## 2018-04-19 ENCOUNTER — OFFICE VISIT (OUTPATIENT)
Dept: FAMILY MEDICINE CLINIC | Facility: CLINIC | Age: 63
End: 2018-04-19
Payer: MEDICARE

## 2018-04-19 VITALS
RESPIRATION RATE: 16 BRPM | DIASTOLIC BLOOD PRESSURE: 76 MMHG | HEART RATE: 85 BPM | TEMPERATURE: 98.4 F | BODY MASS INDEX: 25.93 KG/M2 | OXYGEN SATURATION: 98 % | SYSTOLIC BLOOD PRESSURE: 122 MMHG | WEIGHT: 132.1 LBS | HEIGHT: 60 IN

## 2018-04-19 DIAGNOSIS — E78.2 MIXED HYPERLIPIDEMIA: ICD-10-CM

## 2018-04-19 DIAGNOSIS — I10 ESSENTIAL HYPERTENSION: ICD-10-CM

## 2018-04-19 DIAGNOSIS — E11.9 TYPE 2 DIABETES MELLITUS WITHOUT COMPLICATION, WITHOUT LONG-TERM CURRENT USE OF INSULIN (HCC): Primary | ICD-10-CM

## 2018-04-19 PROBLEM — E78.5 HYPERLIPIDEMIA: Status: ACTIVE | Noted: 2017-01-25

## 2018-04-19 PROCEDURE — 99214 OFFICE O/P EST MOD 30 MIN: CPT | Performed by: PHYSICIAN ASSISTANT

## 2018-04-19 RX ORDER — LISINOPRIL 5 MG/1
5 TABLET ORAL DAILY
Qty: 90 TABLET | Refills: 1 | Status: SHIPPED | OUTPATIENT
Start: 2018-04-19

## 2018-04-19 RX ORDER — LINAGLIPTIN 5 MG/1
5 TABLET, FILM COATED ORAL DAILY
Qty: 90 TABLET | Refills: 1 | Status: SHIPPED | OUTPATIENT
Start: 2018-04-19

## 2018-04-19 RX ORDER — SIMVASTATIN 10 MG
10 TABLET ORAL DAILY
Qty: 90 TABLET | Refills: 1 | Status: SHIPPED | OUTPATIENT
Start: 2018-04-19

## 2018-04-19 RX ORDER — GLIPIZIDE 10 MG/1
10 TABLET, FILM COATED, EXTENDED RELEASE ORAL DAILY
Qty: 90 TABLET | Refills: 1 | Status: SHIPPED | OUTPATIENT
Start: 2018-04-19 | End: 2018-08-08 | Stop reason: SDUPTHER

## 2018-04-19 NOTE — ASSESSMENT & PLAN NOTE
No recent labs  Patient to continue simvastatin 10 mg  Blood work to [email or web address] and she will have this done fasting at her earliest convenience

## 2018-04-19 NOTE — PROGRESS NOTES
Assessment/Plan:    Type 2 diabetes mellitus without complication ([facility] [address] )    No recent labs, patient will schedule appointment with our nurse to have blood work drawn  She will continue glipizide, Tradjenta and metformin daily and will adjust according to lab results  She is on an ACE-inhibitor and statin  Last eye exam was October 2017  Last foot exam was August 2017  Essential hypertension   BP today 122/76  Patient taking lisinopril 5 mg daily  Hyperlipidemia    No recent labs  Patient to continue simvastatin 10 mg  Blood work to [employer] and she will have this done fasting at her earliest convenience  Diagnoses and all orders for this visit:    Type 2 diabetes mellitus without complication, without long-term current use of insulin (HCC)  -     TRADJENTA 5 MG TABS; Take 5 mg by mouth daily  -     metFORMIN (GLUCOPHAGE) 1000 MG tablet; Take 1 tablet (1,000 mg total) by mouth daily with breakfast  -     lisinopril (ZESTRIL) 5 mg tablet; Take 1 tablet (5 mg total) by mouth daily  -     glipiZIDE (GLUCOTROL XL) 10 mg 24 hr tablet; Take 1 tablet (10 mg total) by mouth daily    Mixed hyperlipidemia  -     simvastatin (ZOCOR) 10 mg tablet; Take 1 tablet (10 mg total) by mouth daily    Essential hypertension        25-year-old female presenting today for routine 4 month follow-up for chronic conditions  She admits to compliance on medications but cannot confirm which she is taking  Janumet was replaced by Joe Durant in February secondary to insurance  Coverage change  She has no recent blood work so  She will make an appointment at our office to come in at her earliest convenience for fasting blood work, blood work orders are in  I explained to patient that this blood work is importance in making sure her conditions are controlled  Plan as above with her chronic conditions  Colonoscopy up-to-date last done 2013  Script for mammogram was given    She turns 70 in January so I did advise welcome to Medicare visit next time with a 4 month follow-up  I explained the patient would be a good idea to bring in all of her pill bottles so we can confirm what she is taking with our active medication list for her  Chief Complaint   Patient presents with    Follow-up     4M     Subjective:      Patient ID: Vadim Delgado is a 70 y o  female  64y/o female here today for routine med check  She is feeling well, no complaints  Still eating rice and noddles, some veggies and small amount of meat  No exercise  She admits to being compliant on her medications, need refills today  Denies sxs of hypoglycemia  She eats 2 meals a day, minima snacking  The following portions of the patient's history were reviewed and updated as appropriate: allergies, current medications, past family history, past medical history, past social history, past surgical history and problem list     Review of Systems   Constitutional: Negative  Respiratory: Negative  Cardiovascular: Negative  Gastrointestinal: Negative  Genitourinary: Negative  Neurological: Negative  Psychiatric/Behavioral: Negative  Objective:      /76 (BP Location: Left arm, Patient Position: Sitting, Cuff Size: Standard)   Pulse 85   Temp 98 4 °F (36 9 °C) (Tympanic)   Resp 16   Ht 5' (1 524 m)   Wt 59 9 kg (132 lb 1 6 oz)   SpO2 98%   BMI 25 80 kg/m²          Physical Exam   Constitutional: She is oriented to person, place, and time  She appears well-developed and well-nourished  Neck: Normal carotid pulses present  Carotid bruit is not present  Cardiovascular: Normal rate, regular rhythm, normal heart sounds and normal pulses  Pulmonary/Chest: Effort normal and breath sounds normal    Abdominal: Soft  Bowel sounds are normal  There is no tenderness  Lymphadenopathy:   No head or neck LAD   Neurological: She is alert and oriented to person, place, and time  Psychiatric: She has a normal mood and affect     Vitals reviewed

## 2018-04-19 NOTE — ASSESSMENT & PLAN NOTE
No recent labs, patient will schedule appointment with our nurse to have blood work drawn  She will continue glipizide, Tradjenta and metformin daily and will adjust according to lab results  She is on an ACE-inhibitor and statin  Last eye exam was October 2017  Last foot exam was August 2017

## 2018-08-08 DIAGNOSIS — E11.9 TYPE 2 DIABETES MELLITUS WITHOUT COMPLICATION, WITHOUT LONG-TERM CURRENT USE OF INSULIN (HCC): ICD-10-CM

## 2018-08-08 RX ORDER — GLIPIZIDE 10 MG/1
TABLET, FILM COATED, EXTENDED RELEASE ORAL
Qty: 90 TABLET | Refills: 1 | Status: SHIPPED | OUTPATIENT
Start: 2018-08-08